# Patient Record
Sex: FEMALE | Race: WHITE | NOT HISPANIC OR LATINO | URBAN - METROPOLITAN AREA
[De-identification: names, ages, dates, MRNs, and addresses within clinical notes are randomized per-mention and may not be internally consistent; named-entity substitution may affect disease eponyms.]

---

## 2018-02-07 ENCOUNTER — ON CAMPUS - OUTPATIENT (AMBULATORY)
Dept: URBAN - METROPOLITAN AREA HOSPITAL 2 | Facility: HOSPITAL | Age: 43
End: 2018-02-07
Payer: COMMERCIAL

## 2018-02-07 ENCOUNTER — OFFICE (AMBULATORY)
Dept: URBAN - METROPOLITAN AREA PATHOLOGY 4 | Facility: PATHOLOGY | Age: 43
End: 2018-02-07
Payer: COMMERCIAL

## 2018-02-07 VITALS
DIASTOLIC BLOOD PRESSURE: 57 MMHG | DIASTOLIC BLOOD PRESSURE: 74 MMHG | SYSTOLIC BLOOD PRESSURE: 109 MMHG | OXYGEN SATURATION: 99 % | DIASTOLIC BLOOD PRESSURE: 78 MMHG | HEART RATE: 94 BPM | HEART RATE: 86 BPM | HEART RATE: 84 BPM | DIASTOLIC BLOOD PRESSURE: 61 MMHG | SYSTOLIC BLOOD PRESSURE: 97 MMHG | SYSTOLIC BLOOD PRESSURE: 117 MMHG | OXYGEN SATURATION: 98 % | OXYGEN SATURATION: 95 % | RESPIRATION RATE: 18 BRPM | HEART RATE: 93 BPM | OXYGEN SATURATION: 96 % | DIASTOLIC BLOOD PRESSURE: 58 MMHG | HEART RATE: 89 BPM | HEART RATE: 88 BPM | DIASTOLIC BLOOD PRESSURE: 60 MMHG | TEMPERATURE: 98 F | SYSTOLIC BLOOD PRESSURE: 111 MMHG | SYSTOLIC BLOOD PRESSURE: 135 MMHG | RESPIRATION RATE: 16 BRPM | SYSTOLIC BLOOD PRESSURE: 102 MMHG | SYSTOLIC BLOOD PRESSURE: 137 MMHG | HEIGHT: 67 IN | DIASTOLIC BLOOD PRESSURE: 54 MMHG | OXYGEN SATURATION: 100 % | WEIGHT: 200 LBS

## 2018-02-07 DIAGNOSIS — Z80.0 FAMILY HISTORY OF MALIGNANT NEOPLASM OF DIGESTIVE ORGANS: ICD-10-CM

## 2018-02-07 DIAGNOSIS — D12.0 BENIGN NEOPLASM OF CECUM: ICD-10-CM

## 2018-02-07 DIAGNOSIS — Z86.010 PERSONAL HISTORY OF COLONIC POLYPS: ICD-10-CM

## 2018-02-07 LAB
GI HISTOLOGY: A. UNSPECIFIED: (no result)
GI HISTOLOGY: PDF REPORT: (no result)

## 2018-02-07 PROCEDURE — 45385 COLONOSCOPY W/LESION REMOVAL: CPT

## 2018-02-07 PROCEDURE — 88305 TISSUE EXAM BY PATHOLOGIST: CPT

## 2018-02-07 RX ADMIN — PROPOFOL: 10 INJECTION, EMULSION INTRAVENOUS at 13:16

## 2019-08-02 ENCOUNTER — TRANSCRIBE ORDERS (OUTPATIENT)
Dept: PHYSICAL THERAPY | Facility: CLINIC | Age: 44
End: 2019-08-02

## 2019-08-02 DIAGNOSIS — M54.2 CERVICAL PAIN: Primary | ICD-10-CM

## 2019-08-08 ENCOUNTER — OFFICE VISIT (OUTPATIENT)
Dept: PHYSICAL THERAPY | Facility: CLINIC | Age: 44
End: 2019-08-08

## 2019-08-08 DIAGNOSIS — M54.2 PAIN, NECK: Primary | ICD-10-CM

## 2019-08-08 PROCEDURE — 97163 PT EVAL HIGH COMPLEX 45 MIN: CPT | Performed by: PHYSICAL THERAPIST

## 2019-08-08 PROCEDURE — 97014 ELECTRIC STIMULATION THERAPY: CPT | Performed by: PHYSICAL THERAPIST

## 2019-08-08 NOTE — PROGRESS NOTES
Physical Therapy Initial Evaluation and Plan of Care    Patient: Lydia Sandoval   : 1975  Diagnosis/ICD-10 Code:  Pain, neck [M54.2]  Referring practitioner: Fabrizio Gasca MD  Date of Initial Visit: 2019  Today's Date: 2019  Patient seen for 1 sessions           Subjective Questionnaire: NDI: 80%      Subjective Evaluation    History of Present Illness  Mechanism of injury: Patient is a 44 year old female s/p posterior cervical fusion in 2018 (C5-C7).  Prior to this most recent surgery, pt had anterior cervical cage placement in .  Pt had bone stimulator from 2018 - 2019 (265 days) due to prior issues from surgery in .  Pt states the bone is growing but has not fully healed. Pt reports neck pain starts at the base of the neck and radiates into the top of her head, left is worse than the right (occurs as soon as she gets up in the morning).  Pt also complains of pain and tightness in B UTs (L>R).  As soon as she lies supine, she has NT of B arms into her hands.  Pt has difficulty turning head, lifting >5 pounds, looking down, sitting, driving, sleeping, and has daily headaches.  NT worsens with writing, driving, and reaching arms above chest level.    Pt had 5 lymph nodes removed from L neck and one from L axilla so has swelling in anterior/lateral neck and occasional swelling into L arm.      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   Quality of life: fair    Pain  Current pain ratin  At best pain ratin  At worst pain ratin  Location: neck, top of head, B shoulders   Quality: dull ache  Relieving factors: rest (lying down)  Aggravating factors: standing, sleeping, outstretched reach, movement, lifting and overhead activity  Progression: worsening    Social Support  Lives in: one-story house (basement where laundry is located )  Lives with: spouse (daughter can assist if needed,  is  disabled)    Hand dominance: right    Treatments  Previous treatment: medication  Current treatment: medication  Patient Goals  Patient goals for therapy: decreased pain, increased motion, independence with ADLs/IADLs and return to sport/leisure activities             Objective     Special Questions  Patient is experiencing headaches.       Postural Observations  Seated posture: fair  Standing posture: fair    Additional Postural Observation Details  Swelling noted at L anterolateral neck    Palpation   Left   Muscle spasm in the upper trapezius.   Tenderness of the cervical paraspinals, middle trapezius, suboccipitals and upper trapezius.     Right   Muscle spasm in the upper trapezius. Tenderness of the cervical paraspinals, middle trapezius, suboccipitals and upper trapezius.     Neurological Testing     Sensation   Cervical/Thoracic   Left   Intact: light touch    Right   Intact: light touch    Reflexes   Left   Biceps (C5/C6): normal (2+)    Right   Biceps (C5/C6): normal (2+)    Additional Neurological Details  Unable to illicit B C7 reflex possibly due to muscle guarding per elevated pain levels     Active Range of Motion   Cervical/Thoracic Spine   Cervical    Flexion: 35 degrees with pain  Extension: 21 degrees with pain  Left lateral flexion: 12 degrees with pain  Right lateral flexion: 15 degrees with pain  Left rotation: 33 degrees with pain  Right rotation: 25 degrees with pain    Additional Active Range of Motion Details  B shoulder flexion and abduction WFL but increased NT into BUEs with patient also complaining of pinching and pain at B upper traps  B elbow flexion and extension WFL but patient reported mild increase in pinching sensation/pain at B upper traps with AROM    Passive Range of Motion     Additional Passive Range of Motion Details  Not assessed per elevated pain levels today    Strength/Myotome Testing     Left Shoulder     Planes of Motion   Flexion: 4   Abduction: 4-   External  rotation at 0°: 4-   Internal rotation at 0°: 4     Right Shoulder     Planes of Motion   Flexion: 4   Abduction: 4   External rotation at 0°: 4   Internal rotation at 0°: 4     Left Elbow   Flexion: 4  Extension: 4-    Right Elbow   Flexion: 4+  Extension: 4-    Tests     Additional Tests Details  Distraction - no significant change in pain level  ULTT not formally assessed but patient reported increased NT in BUEs when instructed in median, radial, and ulnar nerve glides         Assessment & Plan     Assessment  Impairments: abnormal or restricted ROM, activity intolerance, impaired physical strength, lacks appropriate home exercise program and pain with function  Assessment details: Patient is a 44 year old female with a complicated PMH.  Patient presents today with complaints of head, neck, and BUE pain and NT.  Pt reports pain is never lower than a 5/10 and the only thing that improves pain is lying supine but BUE NT worsens in supine.  Pt had posterior cervical fusion in September 2018 but per pt, bones are not fully healed around hardware.  Pt also presents with L sided (anterolateral) cervical swelling with recent lymph node removal but patient reports blood tests from oncology were negative. Pt currently unable to work per elevated pain levels and has difficulty with her daily activities.  Patient would benefit from skilled physical therapy to decrease pain, increase tolerance to daily activities, and improve quality of life.    Prognosis: fair  Functional Limitations: carrying objects, lifting, sleeping, walking, pulling, pushing, uncomfortable because of pain, standing, reaching behind back and reaching overhead  Goals  Plan Goals: STG:  -Patient will demonstrate increased cervical AROM by 5 degrees in all planes for improved ability to turn head while driving in 3 weeks.  -Patient will require less than 3 verbal or tactile cues for appropriate performance of HEP in 3 weeks.   -Patient will report a  reduction in frequency of BUE NT to indicate centralizing symptoms in 3-4 weeks.    LTG:  -Patient will report a reduction in cervical and shoulder pain by >/=50% for improved tolerance to daily activities by discharge.  -Patient will be independent with HEP for self-management of condition by discharge.  -Patient will report a reduction in NT in BUEs by >/=25% for improved tolerance to lying supine by discharge.  -Patient will demonstrate B shoulder AROM to WFL without increased shoulder or neck pain for improved ability to reach overhead by discharge.    Plan  Therapy options: will be seen for skilled physical therapy services  Planned modality interventions: cryotherapy, electrical stimulation/Russian stimulation, thermotherapy (hydrocollator packs), TENS and ultrasound  Other planned modality interventions: dry needling  Planned therapy interventions: flexibility, functional ROM exercises, home exercise program, joint mobilization, therapeutic activities, stretching, strengthening, spinal/joint mobilization, soft tissue mobilization, postural training and manual therapy  Treatment plan discussed with: patient  Plan details: Patient would benefit from lymphedema evaluation for cervical swelling.   Plan of care to include 30 visit duration        Timed:         Manual Therapy:         mins  76025;     Therapeutic Exercise:         mins  62476;     Neuromuscular Carter:        mins  65000;    Therapeutic Activity:          mins  36881;     Gait Training:           mins  05089;     Ultrasound:          mins  75280;    Ionto                                   mins   38042  Self-care  ____ mins 36459    Un-Timed:  Electrical Stimulation:    15     mins  65091 ( );  Dry Needling          mins self-pay  Traction          mins 33633  Low Eval          Mins  59831  Mod Eval          Mins  06433  High Eval                       45     Mins  19012        Timed Treatment:   0   mins   Total Treatment:     60   mins    PT  SIGNATURE: Julia Alfredo PT   [place] License # [id number]  Physical Therapist  DATE TREATMENT INITIATED: 8/8/2019    Initial Certification  Certification Period: 11/6/2019  I certify that the therapy services are furnished while this patient is under my care.  The services outlined above are required by this patient, and will be reviewed every 90 days.     PHYSICIAN: Fabrizio Gasca MD      DATE:     Please sign and return via fax to  .. Thank you, [facility].

## 2019-08-16 ENCOUNTER — OFFICE VISIT (OUTPATIENT)
Dept: PHYSICAL THERAPY | Facility: CLINIC | Age: 44
End: 2019-08-16

## 2019-08-16 DIAGNOSIS — M54.2 PAIN, NECK: Primary | ICD-10-CM

## 2019-08-16 PROCEDURE — 97110 THERAPEUTIC EXERCISES: CPT | Performed by: PHYSICAL THERAPIST

## 2019-08-16 PROCEDURE — 97140 MANUAL THERAPY 1/> REGIONS: CPT | Performed by: PHYSICAL THERAPIST

## 2019-08-16 PROCEDURE — G0283 ELEC STIM OTHER THAN WOUND: HCPCS | Performed by: PHYSICAL THERAPIST

## 2019-08-16 NOTE — PROGRESS NOTES
Physical Therapy Daily Progress Note      Patient: Lydia Sandoval   : 1975  Diagnosis/ICD-10 Code:  Pain, neck [M54.2]  Referring practitioner: Fabrizio Gasca MD  Date of Initial Visit: Type: THERAPY  Noted: 2019  Today's Date: 2019  Patient seen for 2 sessions         Lydia Sandoval reports: the initial evaluation wasn't too bad but states she is still having B cervical pain with L UE presenting with numbness when lying supine and the L side of her neck having more pain than the R. Pt. Reports she has had pain for 10 years in the cervical region despite attempts at surgical correction.    Objective   See Exercise, Manual, and Modality Logs for complete treatment.       Assessment/Plan  Pt. Tolerated treatment well this visit and presented with exteremly sensitive and limites U motion due to pain. Pt. Was able to tolerate dowel geno exercises for shoulder ROM and very gentle STM's for relief of tenderness. Pt. Stated therapy felt good during and the Estim and heat helped relieve some pain but no change in symptoms of numbness have responded to therapy at this time.    Progress per Plan of Care           Timed:         Manual Therapy:    15     mins  91166;     Therapeutic Exercise:    10     mins  00034;     Neuromuscular Carter:        mins  10338;    Therapeutic Activity:          mins  60745;     Gait Training:           mins  10088;     Ultrasound:          mins  83112;    Ionto                                   mins   66923  Self Care                            mins   74374  Canalith Repos                   mins  4209    Un-Timed:  Electrical Stimulation:    15     mins  95978 ( );  Dry Needling          mins self-pay  Traction          mins 63537  Low Eval          Mins  33236  Mod Eval          Mins  27548  High Eval                            Mins  32423    Timed Treatment:   25   mins   Total Treatment:     40   mins    Maura Rangel PTA  Physical Therapist Assistant License  #[id number]

## 2019-08-22 ENCOUNTER — OFFICE VISIT (OUTPATIENT)
Dept: PHYSICAL THERAPY | Facility: CLINIC | Age: 44
End: 2019-08-22

## 2019-08-22 DIAGNOSIS — M54.2 PAIN, NECK: Primary | ICD-10-CM

## 2019-08-22 PROCEDURE — 97035 APP MDLTY 1+ULTRASOUND EA 15: CPT | Performed by: PHYSICAL THERAPIST

## 2019-08-22 PROCEDURE — 97110 THERAPEUTIC EXERCISES: CPT | Performed by: PHYSICAL THERAPIST

## 2019-08-22 PROCEDURE — 97014 ELECTRIC STIMULATION THERAPY: CPT | Performed by: PHYSICAL THERAPIST

## 2019-08-22 NOTE — PROGRESS NOTES
Physical Therapy Daily Progress Note    Patient: Lydia Sandoval   : 1975  Diagnosis/ICD-10 Code:  Pain, neck [M54.2]  Referring practitioner: Fabrizio Gasca MD  Date of Initial Visit: Type: THERAPY  Noted: 2019  Today's Date: 2019  Patient seen for 3 sessions             Subjective Patient reported her neck felt better following last treatment but was very sore at the base of her neck after doing her exercises for a couple days.  Patient states she had performed her HEP >5x/day.    Objective   See Exercise, Manual, and Modality Logs for complete treatment.       Assessment/Plan Patient educated on performing HEP as directed by therapists to minimize excessive soreness.  Trialed ultrasound to B upper traps today with patient reporting improvement in symptoms.  Progressed exercises with no increased neck or shoulder pain reported.      Progress per Plan of Care           Timed:         Manual Therapy:         mins  75453;     Therapeutic Exercise:    15     mins  78246;     Neuromuscular Carter:        mins  13307;    Therapeutic Activity:          mins  36763;     Gait Training:           mins  94749;     Ultrasound:     10     mins  00290;    Ionto                                   mins   66450  Self Care                            mins   25019      Un-Timed:  Electrical Stimulation:    15     mins  95398 ( );  Dry Needling          mins self-pay  Traction          mins 62836  Low Eval          Mins  86142  Mod Eval          Mins  77883  High Eval                            Mins  64280    Timed Treatment:   25   mins   Total Treatment:     40   mins    Julia Alfredo PT  [place] License # [id number]  Physical Therapist

## 2019-08-29 ENCOUNTER — OFFICE VISIT (OUTPATIENT)
Dept: PHYSICAL THERAPY | Facility: CLINIC | Age: 44
End: 2019-08-29

## 2019-08-29 DIAGNOSIS — M54.2 PAIN, NECK: Primary | ICD-10-CM

## 2019-08-29 PROCEDURE — 97014 ELECTRIC STIMULATION THERAPY: CPT | Performed by: PHYSICAL THERAPIST

## 2019-08-29 PROCEDURE — 97035 APP MDLTY 1+ULTRASOUND EA 15: CPT | Performed by: PHYSICAL THERAPIST

## 2019-08-29 PROCEDURE — 97110 THERAPEUTIC EXERCISES: CPT | Performed by: PHYSICAL THERAPIST

## 2019-08-29 NOTE — PROGRESS NOTES
Physical Therapy Daily Progress Note    Patient: Lydia Sandoval   : 1975  Diagnosis/ICD-10 Code:  Pain, neck [M54.2]  Referring practitioner: Fabrizio Gasca MD  Date of Initial Visit: Type: THERAPY  Noted: 2019  Today's Date: 2019  Patient seen for 4 sessions             Subjective Patient 12 minutes late to therapy session this date.  Patient reports her traps have been sore but not any more than normal.  States she has been resting mostly at home except for doing laundry.    Objective   See Exercise, Manual, and Modality Logs for complete treatment.       Assessment/Plan Patient late to therapy today so unable to perform all activities this date.  Patient reported no increased pain with cervical ROM exercises but did report increased numbness in B hands with shoulder pulleys so discontinued.      Progress per Plan of Care           Timed:         Manual Therapy:         mins  52117;     Therapeutic Exercise:    16     mins  46633;     Neuromuscular Carter:        mins  16995;    Therapeutic Activity:          mins  24795;     Gait Training:           mins  37714;     Ultrasound:          mins  77224;    Ionto                                   mins   27517  Self Care                            mins   22217      Un-Timed:  Electrical Stimulation:    15     mins  27152 ( );  Dry Needling          mins self-pay  Traction          mins 46377  Low Eval          Mins  65129  Mod Eval          Mins  18197  High Eval                            Mins  07395    Timed Treatment:   15   mins   Total Treatment:     31   mins    Julia Alfredo PT  [place] License # [id number]  Physical Therapist

## 2019-09-18 ENCOUNTER — TREATMENT (OUTPATIENT)
Dept: PHYSICAL THERAPY | Facility: CLINIC | Age: 44
End: 2019-09-18

## 2019-09-18 DIAGNOSIS — M54.2 PAIN, NECK: Primary | ICD-10-CM

## 2019-09-18 PROCEDURE — 97110 THERAPEUTIC EXERCISES: CPT | Performed by: PHYSICAL THERAPIST

## 2019-09-18 PROCEDURE — 97140 MANUAL THERAPY 1/> REGIONS: CPT | Performed by: PHYSICAL THERAPIST

## 2019-09-18 PROCEDURE — G0283 ELEC STIM OTHER THAN WOUND: HCPCS | Performed by: PHYSICAL THERAPIST

## 2019-09-18 NOTE — PROGRESS NOTES
Physical Therapy Daily Progress Note      Patient: Lydia Sandoval   : 1975  Diagnosis/ICD-10 Code:  Pain, neck [M54.2]  Referring practitioner: Fabrizio Gasca MD  Date of Initial Visit: Type: THERAPY  Noted: 2019  Today's Date: 2019  Patient seen for 5 sessions         Lydia Sandoval reports: she has had insidious shooting/jolting pain into the L suboccipital region that takes a few minutes to get relief. Pt. Reports the L side of her neck is always the worst as far as pain. Pt. Reports she also has a pain that runs down the middle L neck that goes into the anterior surface of the collar bone region.    Objective   See Exercise, Manual, and Modality Logs for complete treatment.     Assessment/Plan   Pt. Struggled to maintain oriented to task this visit due to concern over recent pain and discomfort with forward flexion in the cervical region. Pt. Repeatedly reports she is having this new pain and struggles to find relief. Pt. Is concerned that exercise is causing her increased pian but states she has been warned by MD and PT that restricting movement will only make symptoms worse. Pt. Tolerated manual intervention well today as compared to prior encounters.    Progress per Plan of Care           Timed:         Manual Therapy:    15     mins  03315;     Therapeutic Exercise:    16     mins  94735;     Neuromuscular Carter:        mins  67408;    Therapeutic Activity:          mins  34353;     Gait Training:           mins  01669;     Ultrasound:          mins  76821;    Ionto                                   mins   99478  Self Care                            mins   92602  Canalith Repos                   mins  4209    Un-Timed:  Electrical Stimulation:    15     mins  17387 ( );  Dry Needling          mins self-pay  Traction          mins 85278  Low Eval          Mins  73559  Mod Eval          Mins  01927  High Eval                            Mins  76266    Timed Treatment:   31    mins   Total Treatment:     46   mins    Maura Rangel PTA  Physical Therapist Assistant License #[id number]

## 2019-09-24 ENCOUNTER — TREATMENT (OUTPATIENT)
Dept: PHYSICAL THERAPY | Facility: CLINIC | Age: 44
End: 2019-09-24

## 2019-09-24 DIAGNOSIS — M54.2 PAIN, NECK: Primary | ICD-10-CM

## 2019-09-24 PROCEDURE — 97140 MANUAL THERAPY 1/> REGIONS: CPT | Performed by: PHYSICAL THERAPIST

## 2019-09-24 PROCEDURE — 97110 THERAPEUTIC EXERCISES: CPT | Performed by: PHYSICAL THERAPIST

## 2019-09-24 PROCEDURE — G0283 ELEC STIM OTHER THAN WOUND: HCPCS | Performed by: PHYSICAL THERAPIST

## 2019-10-02 ENCOUNTER — TREATMENT (OUTPATIENT)
Dept: PHYSICAL THERAPY | Facility: CLINIC | Age: 44
End: 2019-10-02

## 2019-10-02 DIAGNOSIS — M54.2 PAIN, NECK: Primary | ICD-10-CM

## 2019-10-02 PROCEDURE — G0283 ELEC STIM OTHER THAN WOUND: HCPCS | Performed by: PHYSICAL THERAPIST

## 2019-10-02 PROCEDURE — 97140 MANUAL THERAPY 1/> REGIONS: CPT | Performed by: PHYSICAL THERAPIST

## 2019-10-02 PROCEDURE — 97110 THERAPEUTIC EXERCISES: CPT | Performed by: PHYSICAL THERAPIST

## 2019-10-02 NOTE — PROGRESS NOTES
Physical Therapy Daily Progress Note      Patient: Lydia Sandoval   : 1975  Diagnosis/ICD-10 Code:  Pain, neck [M54.2]  Referring practitioner: Fabrizio Gasca MD  Date of Initial Visit: Type: THERAPY  Noted: 2019  Today's Date: 10/2/2019  Patient seen for 7 sessions             Subjective Pt continues to have pain and tightness in her UT's (L>R) and she also still c/o numbness in her left UE.    Objective   See Exercise, Manual, and Modality Logs for complete treatment.       Assessment/Plan  Pt tolerated manual techniques well overall.  Muscle guarding noted in UT's and tightness in her suboccipitals too.  She had fairly good tolerance with there ex and good tolerance with stim and MH.    Progress per Plan of Care           Timed:         Manual Therapy:    15     mins  72142;     Therapeutic Exercise:    15     mins  55125;     Neuromuscular Carter:        mins  80472;    Therapeutic Activity:          mins  35067;     Gait Training:           mins  98971;     Ultrasound:          mins  44406;    Ionto                                   mins   14972  Self Care                            mins   11679      Un-Timed:  Electrical Stimulation:    15     mins  35499 ( );  Dry Needling          mins self-pay  Traction          mins 37849      Timed Treatment:   30   mins   Total Treatment:     45   mins    Luis Felipe Alexandra PTA  Physical Therapist

## 2019-10-23 ENCOUNTER — TREATMENT (OUTPATIENT)
Dept: PHYSICAL THERAPY | Facility: CLINIC | Age: 44
End: 2019-10-23

## 2019-10-23 DIAGNOSIS — M54.2 PAIN, NECK: Primary | ICD-10-CM

## 2019-10-23 PROCEDURE — 97014 ELECTRIC STIMULATION THERAPY: CPT | Performed by: PHYSICAL THERAPIST

## 2019-10-23 PROCEDURE — 97110 THERAPEUTIC EXERCISES: CPT | Performed by: PHYSICAL THERAPIST

## 2019-10-23 PROCEDURE — 97140 MANUAL THERAPY 1/> REGIONS: CPT | Performed by: PHYSICAL THERAPIST

## 2019-10-23 NOTE — PROGRESS NOTES
Physical Therapy Daily Progress Note      Patient: Lydia Sandoval   : 1975  Diagnosis/ICD-10 Code:  Pain, neck [M54.2]  Referring practitioner: Fabrizio Gasca MD  Date of Initial Visit: Type: THERAPY  Noted: 2019  Today's Date: 10/23/2019  Patient seen for 8 sessions             Subjective Pt says that she is hurting mainly in the left side of her neck and into her left sternoclavicular joint.  Pain Intensity (0-10): 6    Objective   See Exercise, Manual, and Modality Logs for complete treatment.       Assessment/Plan Pt had fair tolerance with today's treatment, although she did continue to have pain in her left cervical, UT and sternoclavicular joint.  Good tolerance with modalities.    Progress per Plan of Care           Timed:         Manual Therapy:    15     mins  60376;     Therapeutic Exercise:    15     mins  51828;     Neuromuscular Carter:        mins  61587;    Therapeutic Activity:          mins  57202;     Gait Training:           mins  72460;     Ultrasound:          mins  53327;    Ionto                                   mins   25302  Self Care                            mins   36872      Un-Timed:  Electrical Stimulation:    15     mins  44053 ( );  Dry Needling          mins self-pay  Traction          mins 00450      Timed Treatment:   30   mins   Total Treatment:     45   mins    Luis Felipe Alexandra PTA  Physical Therapist

## 2019-12-10 ENCOUNTER — DOCUMENTATION (OUTPATIENT)
Dept: PHYSICAL THERAPY | Facility: CLINIC | Age: 44
End: 2019-12-10

## 2019-12-10 NOTE — PROGRESS NOTES
"Discharge Summary  Discharge Summary from Physical Therapy Report      Dates  PT visit: 8/8/19 - 10/23/19  Number of Visits: 8     Discharge Status of Patient: See MD Note dated 10/23/19    Goals: Partially Met    Discharge Plan: Patient to continue with current HEP      Comments Patient attended 8 PT sessions with slow progress toward goals.  Patient demonstrate no significant change in cervical mobility but decreased tightness noted in B upper traps.  Patient required frequent cueing to perform exercises within ROM that did not provoke symptoms and to modify activity at home per patient reporting she \"over did it\".  Patient cancelled last appointment and did not call back to reschedule.     Date of Discharge 12/10/19        Julia Alfredo, PT  Physical Therapist                      "

## 2020-05-29 ENCOUNTER — TRANSCRIBE ORDERS (OUTPATIENT)
Dept: ADMINISTRATIVE | Facility: HOSPITAL | Age: 45
End: 2020-05-29

## 2020-05-29 DIAGNOSIS — M54.12 CERVICAL RADICULOPATHY: Primary | ICD-10-CM

## 2020-06-26 ENCOUNTER — APPOINTMENT (OUTPATIENT)
Dept: NEUROLOGY | Facility: HOSPITAL | Age: 45
End: 2020-06-26

## 2023-01-24 PROBLEM — Z86.010 PERSONAL HISTORY OF COLONIC POLYPS: Status: ACTIVE | Noted: 2018-02-07

## 2023-02-24 ENCOUNTER — ON CAMPUS - OUTPATIENT (AMBULATORY)
Dept: URBAN - METROPOLITAN AREA HOSPITAL 2 | Facility: HOSPITAL | Age: 48
End: 2023-02-24
Payer: MEDICARE

## 2023-02-24 VITALS
WEIGHT: 197 LBS | TEMPERATURE: 97.3 F | OXYGEN SATURATION: 99 % | DIASTOLIC BLOOD PRESSURE: 71 MMHG | OXYGEN SATURATION: 100 % | DIASTOLIC BLOOD PRESSURE: 62 MMHG | HEART RATE: 86 BPM | SYSTOLIC BLOOD PRESSURE: 138 MMHG | SYSTOLIC BLOOD PRESSURE: 139 MMHG | SYSTOLIC BLOOD PRESSURE: 162 MMHG | HEART RATE: 90 BPM | DIASTOLIC BLOOD PRESSURE: 76 MMHG | SYSTOLIC BLOOD PRESSURE: 133 MMHG | DIASTOLIC BLOOD PRESSURE: 59 MMHG | RESPIRATION RATE: 16 BRPM | HEART RATE: 100 BPM | RESPIRATION RATE: 18 BRPM | HEIGHT: 67 IN | RESPIRATION RATE: 17 BRPM | DIASTOLIC BLOOD PRESSURE: 74 MMHG | RESPIRATION RATE: 15 BRPM | HEART RATE: 87 BPM | HEART RATE: 97 BPM | HEART RATE: 93 BPM | SYSTOLIC BLOOD PRESSURE: 144 MMHG | DIASTOLIC BLOOD PRESSURE: 103 MMHG | SYSTOLIC BLOOD PRESSURE: 141 MMHG | DIASTOLIC BLOOD PRESSURE: 95 MMHG | HEART RATE: 98 BPM | OXYGEN SATURATION: 98 %

## 2023-02-24 DIAGNOSIS — Z86.010 PERSONAL HISTORY OF COLONIC POLYPS: ICD-10-CM

## 2023-02-24 DIAGNOSIS — Z83.71 FAMILY HISTORY OF COLONIC POLYPS: ICD-10-CM

## 2023-02-24 DIAGNOSIS — Z80.0 FAMILY HISTORY OF MALIGNANT NEOPLASM OF DIGESTIVE ORGANS: ICD-10-CM

## 2023-02-24 PROCEDURE — G0105 COLORECTAL SCRN; HI RISK IND: HCPCS | Performed by: INTERNAL MEDICINE

## 2023-05-31 ENCOUNTER — OFFICE (AMBULATORY)
Dept: URBAN - METROPOLITAN AREA CLINIC 64 | Facility: CLINIC | Age: 48
End: 2023-05-31

## 2023-05-31 VITALS
HEIGHT: 67 IN | HEART RATE: 84 BPM | WEIGHT: 198 LBS | DIASTOLIC BLOOD PRESSURE: 85 MMHG | SYSTOLIC BLOOD PRESSURE: 126 MMHG

## 2023-05-31 DIAGNOSIS — R11.2 NAUSEA WITH VOMITING, UNSPECIFIED: ICD-10-CM

## 2023-05-31 DIAGNOSIS — K58.9 IRRITABLE BOWEL SYNDROME WITHOUT DIARRHEA: ICD-10-CM

## 2023-05-31 DIAGNOSIS — R19.7 DIARRHEA, UNSPECIFIED: ICD-10-CM

## 2023-05-31 DIAGNOSIS — R14.0 ABDOMINAL DISTENSION (GASEOUS): ICD-10-CM

## 2023-05-31 PROCEDURE — 99214 OFFICE O/P EST MOD 30 MIN: CPT | Performed by: NURSE PRACTITIONER

## 2023-05-31 RX ORDER — COLESTIPOL HYDROCHLORIDE 1 G/1
1 TABLET, FILM COATED ORAL
Qty: 30 | Refills: 11 | Status: COMPLETED
Start: 2023-05-31 | End: 2024-02-26

## 2023-09-06 ENCOUNTER — OFFICE (AMBULATORY)
Dept: URBAN - METROPOLITAN AREA CLINIC 64 | Facility: CLINIC | Age: 48
End: 2023-09-06
Payer: MEDICAID

## 2023-09-06 VITALS
HEIGHT: 67 IN | SYSTOLIC BLOOD PRESSURE: 126 MMHG | WEIGHT: 205 LBS | DIASTOLIC BLOOD PRESSURE: 86 MMHG | HEART RATE: 93 BPM

## 2023-09-06 DIAGNOSIS — R19.7 DIARRHEA, UNSPECIFIED: ICD-10-CM

## 2023-09-06 DIAGNOSIS — K21.9 GASTRO-ESOPHAGEAL REFLUX DISEASE WITHOUT ESOPHAGITIS: ICD-10-CM

## 2023-09-06 DIAGNOSIS — R11.2 NAUSEA WITH VOMITING, UNSPECIFIED: ICD-10-CM

## 2023-09-06 PROCEDURE — 99213 OFFICE O/P EST LOW 20 MIN: CPT | Performed by: NURSE PRACTITIONER

## 2023-09-06 RX ORDER — FAMOTIDINE 40 MG/1
40 TABLET, FILM COATED ORAL
Qty: 30 | Refills: 11 | Status: COMPLETED
Start: 2023-09-06 | End: 2024-02-26

## 2024-02-26 ENCOUNTER — OFFICE (AMBULATORY)
Dept: URBAN - METROPOLITAN AREA CLINIC 64 | Facility: CLINIC | Age: 49
End: 2024-02-26
Payer: MEDICAID

## 2024-02-26 VITALS
HEIGHT: 67 IN | HEART RATE: 90 BPM | WEIGHT: 209 LBS | DIASTOLIC BLOOD PRESSURE: 80 MMHG | SYSTOLIC BLOOD PRESSURE: 112 MMHG

## 2024-02-26 DIAGNOSIS — K58.9 IRRITABLE BOWEL SYNDROME WITHOUT DIARRHEA: ICD-10-CM

## 2024-02-26 DIAGNOSIS — K21.9 GASTRO-ESOPHAGEAL REFLUX DISEASE WITHOUT ESOPHAGITIS: ICD-10-CM

## 2024-02-26 DIAGNOSIS — R63.5 ABNORMAL WEIGHT GAIN: ICD-10-CM

## 2024-02-26 PROCEDURE — 99213 OFFICE O/P EST LOW 20 MIN: CPT | Performed by: NURSE PRACTITIONER

## 2024-02-26 RX ORDER — FAMOTIDINE 40 MG/1
40 TABLET, FILM COATED ORAL
Qty: 30 | Refills: 11 | Status: COMPLETED
Start: 2023-09-06 | End: 2024-02-26

## 2024-02-26 RX ORDER — PANTOPRAZOLE SODIUM 40 MG/1
40 TABLET, DELAYED RELEASE ORAL
Qty: 30 | Refills: 11 | Status: ACTIVE
Start: 2024-02-26

## 2024-06-17 ENCOUNTER — OFFICE (AMBULATORY)
Dept: URBAN - METROPOLITAN AREA CLINIC 64 | Facility: CLINIC | Age: 49
End: 2024-06-17
Payer: MEDICAID

## 2024-06-17 VITALS
HEART RATE: 83 BPM | SYSTOLIC BLOOD PRESSURE: 116 MMHG | WEIGHT: 207 LBS | DIASTOLIC BLOOD PRESSURE: 74 MMHG | HEIGHT: 67 IN

## 2024-06-17 DIAGNOSIS — K58.9 IRRITABLE BOWEL SYNDROME WITHOUT DIARRHEA: ICD-10-CM

## 2024-06-17 DIAGNOSIS — R14.0 ABDOMINAL DISTENSION (GASEOUS): ICD-10-CM

## 2024-06-17 PROCEDURE — 99212 OFFICE O/P EST SF 10 MIN: CPT | Performed by: NURSE PRACTITIONER

## 2025-07-29 ENCOUNTER — OFFICE (AMBULATORY)
Dept: URBAN - METROPOLITAN AREA CLINIC 64 | Facility: CLINIC | Age: 50
End: 2025-07-29
Payer: MEDICARE

## 2025-07-29 VITALS
WEIGHT: 200 LBS | HEART RATE: 78 BPM | SYSTOLIC BLOOD PRESSURE: 118 MMHG | HEIGHT: 67 IN | DIASTOLIC BLOOD PRESSURE: 80 MMHG

## 2025-07-29 DIAGNOSIS — K21.9 GASTRO-ESOPHAGEAL REFLUX DISEASE WITHOUT ESOPHAGITIS: ICD-10-CM

## 2025-07-29 DIAGNOSIS — R11.2 NAUSEA WITH VOMITING, UNSPECIFIED: ICD-10-CM

## 2025-07-29 DIAGNOSIS — R19.4 CHANGE IN BOWEL HABIT: ICD-10-CM

## 2025-07-29 DIAGNOSIS — K58.9 IRRITABLE BOWEL SYNDROME, UNSPECIFIED: ICD-10-CM

## 2025-07-29 DIAGNOSIS — R10.84 GENERALIZED ABDOMINAL PAIN: ICD-10-CM

## 2025-07-29 PROCEDURE — 99213 OFFICE O/P EST LOW 20 MIN: CPT | Performed by: NURSE PRACTITIONER
